# Patient Record
Sex: MALE | Race: WHITE | NOT HISPANIC OR LATINO
[De-identification: names, ages, dates, MRNs, and addresses within clinical notes are randomized per-mention and may not be internally consistent; named-entity substitution may affect disease eponyms.]

---

## 2018-12-12 ENCOUNTER — APPOINTMENT (OUTPATIENT)
Dept: INTERNAL MEDICINE | Facility: CLINIC | Age: 43
End: 2018-12-12

## 2021-02-08 ENCOUNTER — NON-APPOINTMENT (OUTPATIENT)
Age: 46
End: 2021-02-08

## 2021-02-08 ENCOUNTER — APPOINTMENT (OUTPATIENT)
Dept: INTERNAL MEDICINE | Facility: CLINIC | Age: 46
End: 2021-02-08
Payer: MEDICAID

## 2021-02-08 VITALS
HEART RATE: 89 BPM | TEMPERATURE: 97.16 F | BODY MASS INDEX: 34.36 KG/M2 | DIASTOLIC BLOOD PRESSURE: 96 MMHG | WEIGHT: 232 LBS | SYSTOLIC BLOOD PRESSURE: 134 MMHG | OXYGEN SATURATION: 98 % | HEIGHT: 69 IN

## 2021-02-08 DIAGNOSIS — K62.5 HEMORRHAGE OF ANUS AND RECTUM: ICD-10-CM

## 2021-02-08 DIAGNOSIS — K59.00 CONSTIPATION, UNSPECIFIED: ICD-10-CM

## 2021-02-08 DIAGNOSIS — R03.0 ELEVATED BLOOD-PRESSURE READING, W/OUT DIAGNOSIS OF HYPERTENSION: ICD-10-CM

## 2021-02-08 DIAGNOSIS — Z23 ENCOUNTER FOR IMMUNIZATION: ICD-10-CM

## 2021-02-08 DIAGNOSIS — Z12.11 ENCOUNTER FOR SCREENING FOR MALIGNANT NEOPLASM OF COLON: ICD-10-CM

## 2021-02-08 DIAGNOSIS — Z82.49 FAMILY HISTORY OF ISCHEMIC HEART DISEASE AND OTHER DISEASES OF THE CIRCULATORY SYSTEM: ICD-10-CM

## 2021-02-08 DIAGNOSIS — Z12.12 ENCOUNTER FOR SCREENING FOR MALIGNANT NEOPLASM OF COLON: ICD-10-CM

## 2021-02-08 DIAGNOSIS — Z12.83 ENCOUNTER FOR SCREENING FOR MALIGNANT NEOPLASM OF SKIN: ICD-10-CM

## 2021-02-08 DIAGNOSIS — K21.9 GASTRO-ESOPHAGEAL REFLUX DISEASE W/OUT ESOPHAGITIS: ICD-10-CM

## 2021-02-08 PROCEDURE — 99204 OFFICE O/P NEW MOD 45 MIN: CPT

## 2021-02-08 PROCEDURE — 99072 ADDL SUPL MATRL&STAF TM PHE: CPT

## 2021-02-08 RX ORDER — FAMOTIDINE 20 MG/1
20 TABLET, FILM COATED ORAL
Qty: 30 | Refills: 1 | Status: ACTIVE | COMMUNITY
Start: 2021-02-08 | End: 1900-01-01

## 2021-02-08 NOTE — HISTORY OF PRESENT ILLNESS
[FreeTextEntry8] : Mr. KEANU VICTORIA is a 45 year old man with pmhx of obesity, acid reflux whom presents for a new patient appointment. He endorses heartburn, indigestion. He takes TUMS with symptom relief. He endorses worsening constipation, has tried miralax in the past. He endorses for the past few months he has noticed blood when he wipes. No blood in his stools or in the bowl. He states that for past few days this has occurred more often.

## 2021-02-08 NOTE — PHYSICAL EXAM
[Well Nourished] : well nourished [No Acute Distress] : no acute distress [Well Developed] : well developed [Normal Sclera/Conjunctiva] : normal sclera/conjunctiva [Normal Outer Ear/Nose] : the outer ears and nose were normal in appearance [No Lymphadenopathy] : no lymphadenopathy [Supple] : supple [No Respiratory Distress] : no respiratory distress  [Clear to Auscultation] : lungs were clear to auscultation bilaterally [Normal Rate] : normal rate  [Regular Rhythm] : with a regular rhythm [Normal S1, S2] : normal S1 and S2 [No Murmur] : no murmur heard [No Edema] : there was no peripheral edema [No Extremity Clubbing/Cyanosis] : no extremity clubbing/cyanosis [Soft] : abdomen soft [Non Tender] : non-tender [Non-distended] : non-distended [Normal Bowel Sounds] : normal bowel sounds [Normal Supraclavicular Nodes] : no supraclavicular lymphadenopathy [Normal Posterior Cervical Nodes] : no posterior cervical lymphadenopathy [Normal Anterior Cervical Nodes] : no anterior cervical lymphadenopathy [No CVA Tenderness] : no CVA  tenderness [No Spinal Tenderness] : no spinal tenderness [Grossly Normal Strength/Tone] : grossly normal strength/tone [Coordination Grossly Intact] : coordination grossly intact [No Focal Deficits] : no focal deficits [Normal Gait] : normal gait [Normal Affect] : the affect was normal [Comprehensive Foot Exam Normal] : Right and left foot were examined and both feet are normal. No ulcers in either foot. Toes are normal and with full ROM.  Normal tactile sensation with monofilament testing throughout both feet [Normal Insight/Judgement] : insight and judgment were intact

## 2021-02-17 ENCOUNTER — LABORATORY RESULT (OUTPATIENT)
Age: 46
End: 2021-02-17

## 2021-02-17 ENCOUNTER — APPOINTMENT (OUTPATIENT)
Dept: INTERNAL MEDICINE | Facility: CLINIC | Age: 46
End: 2021-02-17
Payer: MEDICAID

## 2021-02-17 PROCEDURE — 36415 COLL VENOUS BLD VENIPUNCTURE: CPT

## 2021-02-17 PROCEDURE — 99072 ADDL SUPL MATRL&STAF TM PHE: CPT

## 2021-02-19 LAB
25(OH)D3 SERPL-MCNC: 33.4 NG/ML
ALBUMIN SERPL ELPH-MCNC: 4.6 G/DL
ALP BLD-CCNC: 64 U/L
ALT SERPL-CCNC: 32 U/L
ANION GAP SERPL CALC-SCNC: 16 MMOL/L
AST SERPL-CCNC: 23 U/L
BASOPHILS # BLD AUTO: 0.03 K/UL
BASOPHILS NFR BLD AUTO: 0.4 %
BILIRUB SERPL-MCNC: 0.2 MG/DL
BUN SERPL-MCNC: 15 MG/DL
C TRACH RRNA SPEC QL NAA+PROBE: NOT DETECTED
CALCIUM SERPL-MCNC: 9.9 MG/DL
CHLORIDE SERPL-SCNC: 101 MMOL/L
CHOLEST SERPL-MCNC: 161 MG/DL
CO2 SERPL-SCNC: 23 MMOL/L
CREAT SERPL-MCNC: 1.48 MG/DL
EOSINOPHIL # BLD AUTO: 0.16 K/UL
EOSINOPHIL NFR BLD AUTO: 2 %
ESTIMATED AVERAGE GLUCOSE: 105 MG/DL
GLUCOSE SERPL-MCNC: 103 MG/DL
HBA1C MFR BLD HPLC: 5.3 %
HCT VFR BLD CALC: 49.7 %
HCV AB SER QL: NONREACTIVE
HCV S/CO RATIO: 0.11 S/CO
HDLC SERPL-MCNC: 25 MG/DL
HGB BLD-MCNC: 16.9 G/DL
HIV1+2 AB SPEC QL IA.RAPID: NONREACTIVE
IMM GRANULOCYTES NFR BLD AUTO: 0.3 %
LDLC SERPL CALC-MCNC: NORMAL MG/DL
LYMPHOCYTES # BLD AUTO: 3.14 K/UL
LYMPHOCYTES NFR BLD AUTO: 39.8 %
MAN DIFF?: NORMAL
MCHC RBC-ENTMCNC: 29 PG
MCHC RBC-ENTMCNC: 34 GM/DL
MCV RBC AUTO: 85.4 FL
MONOCYTES # BLD AUTO: 0.66 K/UL
MONOCYTES NFR BLD AUTO: 8.4 %
N GONORRHOEA RRNA SPEC QL NAA+PROBE: NOT DETECTED
NEUTROPHILS # BLD AUTO: 3.88 K/UL
NEUTROPHILS NFR BLD AUTO: 49.1 %
NONHDLC SERPL-MCNC: 137 MG/DL
PLATELET # BLD AUTO: 175 K/UL
POTASSIUM SERPL-SCNC: 3.8 MMOL/L
PROT SERPL-MCNC: 7.1 G/DL
RBC # BLD: 5.82 M/UL
RBC # FLD: 12.9 %
SARS-COV-2 IGG SERPL IA-ACNC: <0.1 INDEX
SARS-COV-2 IGG SERPL QL IA: NEGATIVE
SODIUM SERPL-SCNC: 140 MMOL/L
SOURCE AMPLIFICATION: NORMAL
T PALLIDUM AB SER QL IA: NEGATIVE
TRIGL SERPL-MCNC: 411 MG/DL
TSH SERPL-ACNC: 7.15 UIU/ML
VIT B12 SERPL-MCNC: 789 PG/ML
WBC # FLD AUTO: 7.89 K/UL

## 2021-02-19 RX ORDER — OMEGA-3-ACID ETHYL ESTERS CAPSULES 1 G/1
1 CAPSULE, LIQUID FILLED ORAL
Qty: 120 | Refills: 3 | Status: ACTIVE | COMMUNITY
Start: 2021-02-19 | End: 1900-01-01

## 2021-02-20 ENCOUNTER — TRANSCRIPTION ENCOUNTER (OUTPATIENT)
Age: 46
End: 2021-02-20

## 2021-03-04 ENCOUNTER — NON-APPOINTMENT (OUTPATIENT)
Age: 46
End: 2021-03-04

## 2021-03-04 ENCOUNTER — APPOINTMENT (OUTPATIENT)
Dept: INTERNAL MEDICINE | Facility: CLINIC | Age: 46
End: 2021-03-04
Payer: MEDICAID

## 2021-03-04 VITALS
HEIGHT: 69 IN | BODY MASS INDEX: 33.47 KG/M2 | SYSTOLIC BLOOD PRESSURE: 124 MMHG | DIASTOLIC BLOOD PRESSURE: 82 MMHG | TEMPERATURE: 97.7 F | WEIGHT: 226 LBS | HEART RATE: 89 BPM | OXYGEN SATURATION: 98 %

## 2021-03-04 DIAGNOSIS — E66.9 OBESITY, UNSPECIFIED: ICD-10-CM

## 2021-03-04 DIAGNOSIS — E78.1 PURE HYPERGLYCERIDEMIA: ICD-10-CM

## 2021-03-04 DIAGNOSIS — R79.89 OTHER SPECIFIED ABNORMAL FINDINGS OF BLOOD CHEMISTRY: ICD-10-CM

## 2021-03-04 PROCEDURE — 99072 ADDL SUPL MATRL&STAF TM PHE: CPT

## 2021-03-04 PROCEDURE — 99406 BEHAV CHNG SMOKING 3-10 MIN: CPT

## 2021-03-04 PROCEDURE — 99396 PREV VISIT EST AGE 40-64: CPT | Mod: 25

## 2021-03-04 PROCEDURE — 93000 ELECTROCARDIOGRAM COMPLETE: CPT | Mod: 59

## 2021-03-04 PROCEDURE — G0444 DEPRESSION SCREEN ANNUAL: CPT

## 2021-03-04 NOTE — PHYSICAL EXAM
[No Acute Distress] : no acute distress [Well Nourished] : well nourished [Well Developed] : well developed [Well-Appearing] : well-appearing [Normal Voice/Communication] : normal voice/communication [Normal Sclera/Conjunctiva] : normal sclera/conjunctiva [PERRL] : pupils equal round and reactive to light [Normal Outer Ear/Nose] : the outer ears and nose were normal in appearance [Normal Oropharynx] : the oropharynx was normal [Normal TMs] : both tympanic membranes were normal [No JVD] : no jugular venous distention [No Lymphadenopathy] : no lymphadenopathy [Supple] : supple [Thyroid Normal, No Nodules] : the thyroid was normal and there were no nodules present [No Respiratory Distress] : no respiratory distress  [No Accessory Muscle Use] : no accessory muscle use [Clear to Auscultation] : lungs were clear to auscultation bilaterally [Normal Rate] : normal rate  [Regular Rhythm] : with a regular rhythm [Normal S1, S2] : normal S1 and S2 [No Murmur] : no murmur heard [No Carotid Bruits] : no carotid bruits [No Abdominal Bruit] : a ~M bruit was not heard ~T in the abdomen [No Varicosities] : no varicosities [Pedal Pulses Present] : the pedal pulses are present [No Edema] : there was no peripheral edema [No Palpable Aorta] : no palpable aorta [No Extremity Clubbing/Cyanosis] : no extremity clubbing/cyanosis [Soft] : abdomen soft [Non Tender] : non-tender [Non-distended] : non-distended [No Masses] : no abdominal mass palpated [No HSM] : no HSM [Normal Bowel Sounds] : normal bowel sounds [Normal Supraclavicular Nodes] : no supraclavicular lymphadenopathy [Normal Posterior Cervical Nodes] : no posterior cervical lymphadenopathy [Normal Anterior Cervical Nodes] : no anterior cervical lymphadenopathy [No Spinal Tenderness] : no spinal tenderness [No Joint Swelling] : no joint swelling [Grossly Normal Strength/Tone] : grossly normal strength/tone [No Rash] : no rash [Coordination Grossly Intact] : coordination grossly intact [No Focal Deficits] : no focal deficits [Normal Gait] : normal gait [Speech Grossly Normal] : speech grossly normal [Normal Affect] : the affect was normal [Normal Mood] : the mood was normal [Normal Insight/Judgement] : insight and judgment were intact [Alert and Oriented x3] : oriented to person, place, and time [de-identified] : multiple keratoses and nevi on back, chest

## 2021-03-04 NOTE — ASSESSMENT
[FreeTextEntry1] : discussed w pt \par \par reviewed recent labs in detail and his multiple concerns. \par \par he has changed his diet significant and lost weight in past few weeks. BP improved and episodes of blood per rectum have resolved. most likely due to hemorrhoidal irritation. advised on further weight loss, plenty of fluid intake. he is scheduled for GI evaluation as well and would agree with this \par \par cont exercise, weight control, low carb intake \par strongly advised on abstinence from alcohol use \par \par he has made appt for dermatology consult for multiple keratotic lesions \par \par will plan for repeat renal function labs to monitor in few weeks as scheduled. drink plenty of fluids and advised on avoiding NSAIDs for now \par \par discussed smoking cessation in detail, he is adverse to starting rx options, reviewed. but he is interested in quitting and has not had success w patches/gum. suggested trial of nicotine mints and he will try this \par \par discussed hyperlipidemia, cont diet control, omega 3 FAs , recheck lipids in few weeks \par \par RTO 2-3 weeks as scheduled, repeat labs at that time, call earlier prn if any new concerns

## 2021-03-04 NOTE — HISTORY OF PRESENT ILLNESS
[de-identified] : 46 y/o man presents for initial full physical exam w new PMD. first visit noted w Dr Pepper and appreciated, and a few of his concerns were addressed, labs completed. \par \par he has lost 8 lbs since last visit few weeks ago, started exercise, improved his diet. \par he has completely stopped drinking alcohol for past 2 weeks\par \par he smokes cigarettes regularly, has not had success w quitting consistently in the past, he would like to discuss options today \par \par mild renal insufficiency noted on labs , Cr 1.48. he had not been hydrating well prior to few weeks ago and was drinking alcohol somewhat excessively. 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

## 2021-03-04 NOTE — HEALTH RISK ASSESSMENT
[] : Yes [Yes] : Yes [None] : None [Employed] : employed [Significant Other] : lives with significant other [Sexually Active] : sexually active

## 2021-03-04 NOTE — COUNSELING
[Risk of tobacco use and health benefits of smoking cessation discussed] : Risk of tobacco use and health benefits of smoking cessation discussed [Cessation strategies including cessation program discussed] : Cessation strategies including cessation program discussed [Use of nicotine replacement therapies and other medications discussed] : Use of nicotine replacement therapies and other medications discussed [Encouraged to pick a quit date and identify support needed to quit] : Encouraged to pick a quit date and identify support needed to quit [Willing to Quit Smoking] : Willing to quit smoking [Tobacco Use Cessation Intermediate Greater Than 3 Minutes Up to 10 Minutes] : Tobacco Use Cessation Intermediate Greater Than 3 Minutes Up to 10 Minutes [Hazards of at-risk alcohol use discussed] : Hazards of at-risk alcohol use discussed [Quit Drinking] : Quit Drinking [Benefits of weight loss discussed] : Benefits of weight loss discussed [Encouraged to increase physical activity] : Encouraged to increase physical activity

## 2021-03-10 PROBLEM — Z23 ENCOUNTER FOR IMMUNIZATION: Status: ACTIVE | Noted: 2021-03-10

## 2021-03-16 ENCOUNTER — APPOINTMENT (OUTPATIENT)
Dept: INTERNAL MEDICINE | Facility: CLINIC | Age: 46
End: 2021-03-16
Payer: MEDICAID

## 2021-03-16 VITALS — SYSTOLIC BLOOD PRESSURE: 120 MMHG | DIASTOLIC BLOOD PRESSURE: 70 MMHG

## 2021-03-16 LAB
ANION GAP SERPL CALC-SCNC: 10 MMOL/L
APPEARANCE: CLEAR
BILIRUBIN URINE: NEGATIVE
BLOOD URINE: NEGATIVE
BUN SERPL-MCNC: 14 MG/DL
CALCIUM SERPL-MCNC: 10.3 MG/DL
CHLORIDE SERPL-SCNC: 103 MMOL/L
CHOLEST SERPL-MCNC: 161 MG/DL
CO2 SERPL-SCNC: 28 MMOL/L
COLOR: YELLOW
CREAT SERPL-MCNC: 1.18 MG/DL
GLUCOSE QUALITATIVE U: NEGATIVE
GLUCOSE SERPL-MCNC: 92 MG/DL
HBV CORE IGG+IGM SER QL: NONREACTIVE
HBV SURFACE AB SERPL IA-ACNC: <3 MIU/ML
HBV SURFACE AG SER QL: NONREACTIVE
HDLC SERPL-MCNC: 27 MG/DL
KETONES URINE: NEGATIVE
LDLC SERPL CALC-MCNC: 100 MG/DL
LEUKOCYTE ESTERASE URINE: NEGATIVE
NITRITE URINE: NEGATIVE
NONHDLC SERPL-MCNC: 134 MG/DL
PH URINE: 6
POTASSIUM SERPL-SCNC: 4 MMOL/L
PROTEIN URINE: NORMAL
SODIUM SERPL-SCNC: 140 MMOL/L
SPECIFIC GRAVITY URINE: 1.03
TRIGL SERPL-MCNC: 168 MG/DL
UROBILINOGEN URINE: NORMAL

## 2021-03-16 PROCEDURE — 99072 ADDL SUPL MATRL&STAF TM PHE: CPT

## 2021-03-16 PROCEDURE — 36415 COLL VENOUS BLD VENIPUNCTURE: CPT

## 2021-03-17 LAB
APPEARANCE: ABNORMAL
BACTERIA: NEGATIVE
BILIRUBIN URINE: NEGATIVE
BLOOD URINE: NEGATIVE
COLOR: YELLOW
CREAT SPEC-SCNC: 205 MG/DL
CREAT SPEC-SCNC: 205 MG/DL
CREAT/PROT UR: 0.1 RATIO
GLUCOSE QUALITATIVE U: NEGATIVE
HYALINE CASTS: 1 /LPF
KETONES URINE: NEGATIVE
LEUKOCYTE ESTERASE URINE: NEGATIVE
MICROALBUMIN 24H UR DL<=1MG/L-MCNC: <1.2 MG/DL
MICROALBUMIN/CREAT 24H UR-RTO: NORMAL MG/G
MICROSCOPIC-UA: NORMAL
NITRITE URINE: NEGATIVE
PH URINE: 5.5
PROT UR-MCNC: 14 MG/DL
PROTEIN URINE: NEGATIVE
RED BLOOD CELLS URINE: 0 /HPF
SPECIFIC GRAVITY URINE: 1.02
SQUAMOUS EPITHELIAL CELLS: 0 /HPF
UROBILINOGEN URINE: NORMAL
WHITE BLOOD CELLS URINE: 1 /HPF

## 2022-05-20 ENCOUNTER — TRANSCRIPTION ENCOUNTER (OUTPATIENT)
Age: 47
End: 2022-05-20

## 2022-11-21 ENCOUNTER — NON-APPOINTMENT (OUTPATIENT)
Age: 47
End: 2022-11-21

## 2022-11-21 ENCOUNTER — APPOINTMENT (OUTPATIENT)
Dept: INTERNAL MEDICINE | Facility: CLINIC | Age: 47
End: 2022-11-21
Payer: MEDICAID

## 2022-11-21 VITALS
DIASTOLIC BLOOD PRESSURE: 80 MMHG | SYSTOLIC BLOOD PRESSURE: 122 MMHG | BODY MASS INDEX: 29.33 KG/M2 | WEIGHT: 198 LBS | HEIGHT: 69 IN | OXYGEN SATURATION: 98 % | TEMPERATURE: 97.6 F | HEART RATE: 84 BPM

## 2022-11-21 DIAGNOSIS — F17.200 NICOTINE DEPENDENCE, UNSPECIFIED, UNCOMPLICATED: ICD-10-CM

## 2022-11-21 DIAGNOSIS — Z78.9 OTHER SPECIFIED HEALTH STATUS: ICD-10-CM

## 2022-11-21 DIAGNOSIS — Z12.5 ENCOUNTER FOR SCREENING FOR MALIGNANT NEOPLASM OF PROSTATE: ICD-10-CM

## 2022-11-21 DIAGNOSIS — Z87.891 PERSONAL HISTORY OF NICOTINE DEPENDENCE: ICD-10-CM

## 2022-11-21 PROCEDURE — 36415 COLL VENOUS BLD VENIPUNCTURE: CPT

## 2022-11-21 PROCEDURE — 93000 ELECTROCARDIOGRAM COMPLETE: CPT

## 2022-11-21 PROCEDURE — 99396 PREV VISIT EST AGE 40-64: CPT | Mod: 25

## 2022-11-21 RX ORDER — LORATADINE 10 MG
17 TABLET,DISINTEGRATING ORAL
Qty: 1 | Refills: 0 | Status: DISCONTINUED | COMMUNITY
Start: 2021-02-08 | End: 2022-11-21

## 2022-11-21 NOTE — HEALTH RISK ASSESSMENT
[Yes] : Yes [None] : None [Employed] : employed [Significant Other] : lives with significant other [Sexually Active] : sexually active

## 2023-01-01 PROBLEM — Z87.891 FORMER SMOKER: Status: ACTIVE | Noted: 2023-01-01

## 2023-01-01 PROBLEM — F17.200 CURRENT SMOKER: Status: RESOLVED | Noted: 2021-02-08 | Resolved: 2023-01-01

## 2023-01-01 PROBLEM — Z78.9 ALCOHOL USE: Status: RESOLVED | Noted: 2021-02-08 | Resolved: 2023-01-01

## 2023-01-01 PROBLEM — Z87.891 HISTORY OF NICOTINE DEPENDENCE: Status: RESOLVED | Noted: 2021-03-04 | Resolved: 2023-01-01

## 2023-01-01 NOTE — ASSESSMENT
[FreeTextEntry1] : discussed w pt \par \par reviewed updated hx \par \par he has achieved significant weight loss, he is feeling great, exercising regularly \par \par he quit smoking and drinking regularly \par \par encouraged him on continued lifestyle improvement \par \par colonoscopy screening and EGD now UTD w Dr [facility] \par \par vaccinations reviewed \par UTDw COVID vaccines, influenza \par \par discussed hyperlipidemia, cont diet control, cont omega 3 FAs , monitor lipids \par \par RTO 6 months for routine f/u or call earlier prn if any new concerns

## 2023-01-01 NOTE — PHYSICAL EXAM
[No Acute Distress] : no acute distress [Well Nourished] : well nourished [Well Developed] : well developed [Well-Appearing] : well-appearing [Normal Voice/Communication] : normal voice/communication [Normal Sclera/Conjunctiva] : normal sclera/conjunctiva [PERRL] : pupils equal round and reactive to light [Normal Outer Ear/Nose] : the outer ears and nose were normal in appearance [Normal Oropharynx] : the oropharynx was normal [Normal TMs] : both tympanic membranes were normal [No JVD] : no jugular venous distention [No Lymphadenopathy] : no lymphadenopathy [Supple] : supple [Thyroid Normal, No Nodules] : the thyroid was normal and there were no nodules present [No Respiratory Distress] : no respiratory distress  [No Accessory Muscle Use] : no accessory muscle use [Clear to Auscultation] : lungs were clear to auscultation bilaterally [Normal Rate] : normal rate  [Regular Rhythm] : with a regular rhythm [Normal S1, S2] : normal S1 and S2 [No Murmur] : no murmur heard [No Carotid Bruits] : no carotid bruits [No Abdominal Bruit] : a ~M bruit was not heard ~T in the abdomen [No Varicosities] : no varicosities [Pedal Pulses Present] : the pedal pulses are present [No Edema] : there was no peripheral edema [No Palpable Aorta] : no palpable aorta [No Extremity Clubbing/Cyanosis] : no extremity clubbing/cyanosis [Soft] : abdomen soft [Non Tender] : non-tender [Non-distended] : non-distended [No Masses] : no abdominal mass palpated [No HSM] : no HSM [Normal Bowel Sounds] : normal bowel sounds [Normal Supraclavicular Nodes] : no supraclavicular lymphadenopathy [Normal Posterior Cervical Nodes] : no posterior cervical lymphadenopathy [Normal Anterior Cervical Nodes] : no anterior cervical lymphadenopathy [No Spinal Tenderness] : no spinal tenderness [No Joint Swelling] : no joint swelling [Grossly Normal Strength/Tone] : grossly normal strength/tone [No Rash] : no rash [Coordination Grossly Intact] : coordination grossly intact [No Focal Deficits] : no focal deficits [Normal Gait] : normal gait [Speech Grossly Normal] : speech grossly normal [Normal Affect] : the affect was normal [Alert and Oriented x3] : oriented to person, place, and time [Normal Mood] : the mood was normal [Normal Insight/Judgement] : insight and judgment were intact [de-identified] : multiple keratoses and nevi on back, chest

## 2023-01-01 NOTE — HISTORY OF PRESENT ILLNESS
[de-identified] : 48 y/o man presents for annual exam. he feels well overall currently, no new concerns. \par \par since last visit in 3/21, he has improved his lifestyle , changed his diet, started playing baseball regularly and has lost >20 lbs. feels great. \par \par he had full evaluation for his rectal bleeding episode last year w Dr Vinod ABREU including EGD and colonoscopy . hemorrhoid noted which likely caused rectal bleeding, now resolved. had a benign polyp on colonoscopy. \par \par he stopped consuming alcohol and also quit smoking \par \par hypertriglyceridemia noted, he has started omega 3 FAs \par \par he had COVID vaccines x 4 doses \par also had influenza vaccine this season \par

## 2023-06-07 LAB
ALBUMIN SERPL ELPH-MCNC: 4.8 G/DL
ALP BLD-CCNC: 76 U/L
ALT SERPL-CCNC: 17 U/L
ANION GAP SERPL CALC-SCNC: 13 MMOL/L
APPEARANCE: CLEAR
AST SERPL-CCNC: 16 U/L
BASOPHILS # BLD AUTO: 0.02 K/UL
BASOPHILS NFR BLD AUTO: 0.3 %
BILIRUB SERPL-MCNC: 0.5 MG/DL
BILIRUBIN URINE: NEGATIVE
BLOOD URINE: NEGATIVE
BUN SERPL-MCNC: 11 MG/DL
CALCIUM SERPL-MCNC: 9.8 MG/DL
CHLORIDE SERPL-SCNC: 99 MMOL/L
CHOLEST SERPL-MCNC: 183 MG/DL
CO2 SERPL-SCNC: 26 MMOL/L
COLOR: NORMAL
CREAT SERPL-MCNC: 1.05 MG/DL
EGFR: 88 ML/MIN/1.73M2
EOSINOPHIL # BLD AUTO: 0.05 K/UL
EOSINOPHIL NFR BLD AUTO: 0.8 %
ESTIMATED AVERAGE GLUCOSE: 105 MG/DL
GLUCOSE QUALITATIVE U: NEGATIVE
GLUCOSE SERPL-MCNC: 87 MG/DL
HBA1C MFR BLD HPLC: 5.3 %
HCT VFR BLD CALC: 49.1 %
HDLC SERPL-MCNC: 39 MG/DL
HGB BLD-MCNC: 16.3 G/DL
IMM GRANULOCYTES NFR BLD AUTO: 0.2 %
KETONES URINE: NEGATIVE
LDLC SERPL CALC-MCNC: 114 MG/DL
LEUKOCYTE ESTERASE URINE: NEGATIVE
LYMPHOCYTES # BLD AUTO: 1.24 K/UL
LYMPHOCYTES NFR BLD AUTO: 19.6 %
MAN DIFF?: NORMAL
MCHC RBC-ENTMCNC: 29.4 PG
MCHC RBC-ENTMCNC: 33.2 GM/DL
MCV RBC AUTO: 88.5 FL
MONOCYTES # BLD AUTO: 0.71 K/UL
MONOCYTES NFR BLD AUTO: 11.2 %
NEUTROPHILS # BLD AUTO: 4.31 K/UL
NEUTROPHILS NFR BLD AUTO: 67.9 %
NITRITE URINE: NEGATIVE
NONHDLC SERPL-MCNC: 144 MG/DL
PH URINE: 6
PLATELET # BLD AUTO: 158 K/UL
POTASSIUM SERPL-SCNC: 3.7 MMOL/L
PROT SERPL-MCNC: 7.2 G/DL
PROTEIN URINE: NEGATIVE
PSA SERPL-MCNC: 1.12 NG/ML
RBC # BLD: 5.55 M/UL
RBC # FLD: 13.1 %
SODIUM SERPL-SCNC: 138 MMOL/L
SPECIFIC GRAVITY URINE: 1.02
T4 FREE SERPL-MCNC: 1.1 NG/DL
TRIGL SERPL-MCNC: 147 MG/DL
TSH SERPL-ACNC: 4.11 UIU/ML
UROBILINOGEN URINE: NORMAL
WBC # FLD AUTO: 6.34 K/UL

## 2023-12-07 ENCOUNTER — APPOINTMENT (OUTPATIENT)
Dept: INTERNAL MEDICINE | Facility: CLINIC | Age: 48
End: 2023-12-07
Payer: SELF-PAY

## 2023-12-07 ENCOUNTER — NON-APPOINTMENT (OUTPATIENT)
Age: 48
End: 2023-12-07

## 2023-12-07 VITALS
DIASTOLIC BLOOD PRESSURE: 78 MMHG | SYSTOLIC BLOOD PRESSURE: 104 MMHG | HEIGHT: 69 IN | WEIGHT: 210 LBS | TEMPERATURE: 98.6 F | HEART RATE: 75 BPM | BODY MASS INDEX: 31.1 KG/M2 | OXYGEN SATURATION: 98 %

## 2023-12-07 DIAGNOSIS — Z00.00 ENCOUNTER FOR GENERAL ADULT MEDICAL EXAMINATION W/OUT ABNORMAL FINDINGS: ICD-10-CM

## 2023-12-07 DIAGNOSIS — E66.3 OVERWEIGHT: ICD-10-CM

## 2023-12-07 PROCEDURE — 99396 PREV VISIT EST AGE 40-64: CPT | Mod: 25

## 2023-12-07 PROCEDURE — 93000 ELECTROCARDIOGRAM COMPLETE: CPT

## 2024-01-28 NOTE — ASSESSMENT
[FreeTextEntry1] : discussed w pt  reviewed updated hx, he is doing well  cont efforts w maintenance of healthy lifestyle including exercise, weight control, avoiding alcohol and smoking   check routine labs , he is not fasting today and he will return for fasting labs   colonoscopy screening UTD   vaccinations reviewed  EKG reviewed wnl   RTO yearly for routine exam or earlier prn if any new concerns

## 2024-01-28 NOTE — PHYSICAL EXAM
[Normal Supraclavicular Nodes] : no supraclavicular lymphadenopathy [Normal Posterior Cervical Nodes] : no posterior cervical lymphadenopathy [Normal Anterior Cervical Nodes] : no anterior cervical lymphadenopathy [de-identified] : multiple keratoses and nevi on back, chest

## 2024-01-28 NOTE — HISTORY OF PRESENT ILLNESS
[de-identified] : 47 y/o man presents for annual exam. he feels well overall currently, no new concerns.   feels well, no new concerns. has maintained adequate weight, exercising, plays baseball   EGD and colonoscopy done in 2021 w Dr Vinod ABREU , benign polyp on colonoscopy.   he stopped consuming alcohol and also quit smoking   hypertriglycerides on diet control, omega 3 FAs

## 2024-12-24 PROBLEM — F10.90 ALCOHOL USE: Status: RESOLVED | Noted: 2021-02-08 | Resolved: 2023-01-01

## 2025-02-28 ENCOUNTER — APPOINTMENT (OUTPATIENT)
Dept: INTERNAL MEDICINE | Facility: CLINIC | Age: 50
End: 2025-02-28